# Patient Record
Sex: MALE | Race: WHITE | NOT HISPANIC OR LATINO | Employment: FULL TIME | URBAN - METROPOLITAN AREA
[De-identification: names, ages, dates, MRNs, and addresses within clinical notes are randomized per-mention and may not be internally consistent; named-entity substitution may affect disease eponyms.]

---

## 2020-01-23 ENCOUNTER — HOSPITAL ENCOUNTER (OUTPATIENT)
Dept: URGENT CARE | Facility: CLINIC | Age: 45
Discharge: HOME | End: 2020-01-23

## 2021-08-16 PROCEDURE — U0003 INFECTIOUS AGENT DETECTION BY NUCLEIC ACID (DNA OR RNA); SEVERE ACUTE RESPIRATORY SYNDROME CORONAVIRUS 2 (SARS-COV-2) (CORONAVIRUS DISEASE [COVID-19]), AMPLIFIED PROBE TECHNIQUE, MAKING USE OF HIGH THROUGHPUT TECHNOLOGIES AS DESCRIBED BY CMS-2020-01-R: HCPCS | Performed by: PHYSICIAN ASSISTANT

## 2021-08-23 PROCEDURE — U0003 INFECTIOUS AGENT DETECTION BY NUCLEIC ACID (DNA OR RNA); SEVERE ACUTE RESPIRATORY SYNDROME CORONAVIRUS 2 (SARS-COV-2) (CORONAVIRUS DISEASE [COVID-19]), AMPLIFIED PROBE TECHNIQUE, MAKING USE OF HIGH THROUGHPUT TECHNOLOGIES AS DESCRIBED BY CMS-2020-01-R: HCPCS | Performed by: NURSE PRACTITIONER

## 2021-08-26 ENCOUNTER — TELEPHONE (OUTPATIENT)
Dept: URGENT CARE | Facility: CLINIC | Age: 46
End: 2021-08-26

## 2021-08-26 NOTE — TELEPHONE ENCOUNTER
----- Message from Camron Cosby MD sent at 8/25/2021 11:28 AM EDT -----  Please call the patient regarding negative covid

## 2023-05-10 PROBLEM — J45.909 ASTHMA: Status: ACTIVE | Noted: 2023-05-10

## 2024-02-21 ENCOUNTER — OFFICE VISIT (OUTPATIENT)
Dept: FAMILY MEDICINE CLINIC | Facility: CLINIC | Age: 49
End: 2024-02-21
Payer: COMMERCIAL

## 2024-02-21 ENCOUNTER — HOSPITAL ENCOUNTER (OUTPATIENT)
Dept: GENERAL RADIOLOGY | Facility: HOSPITAL | Age: 49
Discharge: HOME | End: 2024-02-21
Payer: COMMERCIAL

## 2024-02-21 ENCOUNTER — LAB (OUTPATIENT)
Dept: LAB | Facility: HOSPITAL | Age: 49
End: 2024-02-21
Payer: COMMERCIAL

## 2024-02-21 VITALS
BODY MASS INDEX: 38.36 KG/M2 | TEMPERATURE: 97.6 F | OXYGEN SATURATION: 96 % | HEIGHT: 72 IN | SYSTOLIC BLOOD PRESSURE: 136 MMHG | WEIGHT: 283.2 LBS | DIASTOLIC BLOOD PRESSURE: 86 MMHG | HEART RATE: 94 BPM

## 2024-02-21 DIAGNOSIS — N52.9 ERECTILE DYSFUNCTION, UNSPECIFIED ERECTILE DYSFUNCTION TYPE: ICD-10-CM

## 2024-02-21 DIAGNOSIS — E29.1 HYPOGONADISM IN MALE: ICD-10-CM

## 2024-02-21 DIAGNOSIS — J30.9 ALLERGIC RHINITIS, UNSPECIFIED SEASONALITY, UNSPECIFIED TRIGGER: ICD-10-CM

## 2024-02-21 DIAGNOSIS — Z12.11 SCREENING FOR COLON CANCER: Primary | ICD-10-CM

## 2024-02-21 DIAGNOSIS — M25.512 CHRONIC LEFT SHOULDER PAIN: ICD-10-CM

## 2024-02-21 DIAGNOSIS — R73.01 IMPAIRED FASTING GLUCOSE: ICD-10-CM

## 2024-02-21 DIAGNOSIS — Z01.89 ROUTINE LAB DRAW: ICD-10-CM

## 2024-02-21 DIAGNOSIS — M54.50 CHRONIC BILATERAL LOW BACK PAIN WITHOUT SCIATICA: ICD-10-CM

## 2024-02-21 DIAGNOSIS — G89.29 CHRONIC BILATERAL LOW BACK PAIN WITHOUT SCIATICA: ICD-10-CM

## 2024-02-21 DIAGNOSIS — Z13.220 SCREENING FOR LIPID DISORDERS: ICD-10-CM

## 2024-02-21 DIAGNOSIS — R06.83 SNORING: ICD-10-CM

## 2024-02-21 DIAGNOSIS — G89.29 CHRONIC LEFT SHOULDER PAIN: ICD-10-CM

## 2024-02-21 DIAGNOSIS — M54.6 ACUTE BILATERAL THORACIC BACK PAIN: ICD-10-CM

## 2024-02-21 DIAGNOSIS — R53.83 FATIGUE, UNSPECIFIED TYPE: ICD-10-CM

## 2024-02-21 LAB
ALBUMIN SERPL-MCNC: 4.3 G/DL (ref 3.5–5.2)
ALBUMIN/GLOB SERPL: 1.5 G/DL
ALP SERPL-CCNC: 84 U/L (ref 39–117)
ALT SERPL W P-5'-P-CCNC: 75 U/L (ref 1–41)
ANION GAP SERPL CALCULATED.3IONS-SCNC: 9.7 MMOL/L (ref 5–15)
AST SERPL-CCNC: 33 U/L (ref 1–40)
BILIRUB SERPL-MCNC: 0.8 MG/DL (ref 0–1.2)
BUN SERPL-MCNC: 12 MG/DL (ref 6–20)
BUN/CREAT SERPL: 15.4 (ref 7–25)
CALCIUM SPEC-SCNC: 9.1 MG/DL (ref 8.6–10.5)
CHLORIDE SERPL-SCNC: 103 MMOL/L (ref 98–107)
CHOLEST SERPL-MCNC: 182 MG/DL (ref 0–200)
CO2 SERPL-SCNC: 28.3 MMOL/L (ref 22–29)
CREAT SERPL-MCNC: 0.78 MG/DL (ref 0.76–1.27)
DEPRECATED RDW RBC AUTO: 38.7 FL (ref 37–54)
EGFRCR SERPLBLD CKD-EPI 2021: 111.4 ML/MIN/1.73
ERYTHROCYTE [DISTWIDTH] IN BLOOD BY AUTOMATED COUNT: 13 % (ref 12.3–15.4)
GLOBULIN UR ELPH-MCNC: 2.8 GM/DL
GLUCOSE SERPL-MCNC: 87 MG/DL (ref 65–99)
HBA1C MFR BLD: 5.6 % (ref 4.8–5.6)
HCT VFR BLD AUTO: 45.9 % (ref 37.5–51)
HDLC SERPL-MCNC: 38 MG/DL (ref 40–60)
HGB BLD-MCNC: 15.3 G/DL (ref 13–17.7)
LDLC SERPL CALC-MCNC: 123 MG/DL (ref 0–100)
LDLC/HDLC SERPL: 3.18 {RATIO}
MCH RBC QN AUTO: 28.1 PG (ref 26.6–33)
MCHC RBC AUTO-ENTMCNC: 33.3 G/DL (ref 31.5–35.7)
MCV RBC AUTO: 84.2 FL (ref 79–97)
PLATELET # BLD AUTO: 270 10*3/MM3 (ref 140–450)
PMV BLD AUTO: 9.7 FL (ref 6–12)
POTASSIUM SERPL-SCNC: 4.3 MMOL/L (ref 3.5–5.2)
PROT SERPL-MCNC: 7.1 G/DL (ref 6–8.5)
RBC # BLD AUTO: 5.45 10*6/MM3 (ref 4.14–5.8)
SODIUM SERPL-SCNC: 141 MMOL/L (ref 136–145)
TESTOST SERPL-MCNC: 262 NG/DL (ref 249–836)
TRIGL SERPL-MCNC: 115 MG/DL (ref 0–150)
TSH SERPL DL<=0.05 MIU/L-ACNC: 1.39 UIU/ML (ref 0.27–4.2)
VLDLC SERPL-MCNC: 21 MG/DL (ref 5–40)
WBC NRBC COR # BLD AUTO: 6.7 10*3/MM3 (ref 3.4–10.8)

## 2024-02-21 PROCEDURE — 72100 X-RAY EXAM L-S SPINE 2/3 VWS: CPT

## 2024-02-21 PROCEDURE — 80061 LIPID PANEL: CPT

## 2024-02-21 PROCEDURE — 36415 COLL VENOUS BLD VENIPUNCTURE: CPT

## 2024-02-21 PROCEDURE — 83036 HEMOGLOBIN GLYCOSYLATED A1C: CPT

## 2024-02-21 PROCEDURE — 84403 ASSAY OF TOTAL TESTOSTERONE: CPT

## 2024-02-21 PROCEDURE — 72072 X-RAY EXAM THORAC SPINE 3VWS: CPT

## 2024-02-21 PROCEDURE — 80050 GENERAL HEALTH PANEL: CPT

## 2024-02-21 PROCEDURE — 73030 X-RAY EXAM OF SHOULDER: CPT

## 2024-02-21 RX ORDER — MONTELUKAST SODIUM 10 MG/1
10 TABLET ORAL NIGHTLY
Qty: 90 TABLET | Refills: 1 | Status: SHIPPED | OUTPATIENT
Start: 2024-02-21

## 2024-02-21 RX ORDER — SILDENAFIL 100 MG/1
100 TABLET, FILM COATED ORAL DAILY PRN
Qty: 30 TABLET | Refills: 0 | Status: SHIPPED | OUTPATIENT
Start: 2024-02-21

## 2024-02-21 NOTE — PROGRESS NOTES
"Chief Complaint  Establish Care    Subjective         Immanuel Garcia presents to [facility]  Patient presents to the office today to establish care.    Patient states that he is wanting to start working out with his son and wants to ensure that he is healthy.  Patient does state that he is due for lab.  He does state that he has a history of chronic left shoulder pain secondary to a previous football injury and has had surgery on this.  He states that he does have limited range of motion to the shoulder.  Patient also states that years ago he injured his back on a boat and states that he has had thoracic/lumbar back pain since.  He states that he never had this evaluated.  He states that if he jogs at all he has increased pain in this area.  Patient has noticed a fluctuation in his blood pressure.  I did ask him with check his daily and keep a log for our review.  He does state that he has concerns regarding his weight.  He states that he is trying to be more active and eat healthier.  He denies wanting any medications to help with weight loss.  He does state that he is having significant fatigue.  He states that he snores throughout the night.  He states that many years ago he had a sleep study but this was conclusive.  I did discuss sending him back up for another sleep study due to his fatigue and snoring.  Patient has never had a colonoscopy.  I did explain that the screenings for this started age 47 and we will get him set up with general surgery for his screening.         Objective     Vitals:    02/21/24 0929   BP: 136/86   BP Location: Right arm   Patient Position: Sitting   Cuff Size: Adult   Pulse: 94   Temp: 97.6 °F (36.4 °C)   SpO2: 96%   Weight: 128 kg (283 lb 3.2 oz)   Height: 182.9 cm (72.01\")      Body mass index is 38.4 kg/m².            Physical Exam  Vitals reviewed.   Constitutional:       Appearance: Normal appearance.   Cardiovascular:      Rate and Rhythm: Normal " rate and regular rhythm.      Pulses: Normal pulses.      Heart sounds: Normal heart sounds, S1 normal and S2 normal. No murmur heard.  Pulmonary:      Effort: Pulmonary effort is normal. No respiratory distress.      Breath sounds: Normal breath sounds.   Musculoskeletal:      Left shoulder: Tenderness present. Decreased range of motion.      Thoracic back: Tenderness present.      Lumbar back: Tenderness present.        Back:    Skin:     General: Skin is warm and dry.   Neurological:      Mental Status: He is alert and oriented to person, place, and time.   Psychiatric:         Attention and Perception: Attention normal.         Mood and Affect: Mood normal.         Behavior: Behavior normal.          Result Review :   The following data was reviewed by: JUDE Goodson on 02/21/2024:        ECG 12 Lead    Date/Time: 2/21/2024 11:53 AM  Performed by: Alvaro Paredes APRN    Authorized by: Alvaro Paredes APRN  Comparison: not compared with previous ECG   Rhythm: sinus rhythm  Rate: normal    Clinical impression: normal ECG  Comments: Sinus rhythm without ectopy           Assessment and Plan   Diagnoses and all orders for this visit:    1. Screening for colon cancer (Primary)  -     Ambulatory Referral to General Surgery    2. Routine lab draw  -     CBC (No Diff); Future  -     Comprehensive Metabolic Panel; Future  -     TSH; Future    3. Screening for lipid disorders  -     Lipid Panel; Future    4. Impaired fasting glucose  -     Hemoglobin A1c; Future    5. Fatigue, unspecified type  -     Testosterone; Future  -     Ambulatory Referral to Sleep Medicine    6. Hypogonadism in male  -     Testosterone; Future    7. Snoring  -     Ambulatory Referral to Sleep Medicine    8. Allergic rhinitis, unspecified seasonality, unspecified trigger  -     montelukast (Singulair) 10 MG tablet; Take 1 tablet by mouth Every Night.  Dispense: 90 tablet; Refill: 1    9. Chronic left shoulder pain  -     XR Shoulder 2+ View Left;  Future    10. Chronic bilateral low back pain without sciatica  -     XR Spine Thoracic 3 View; Future    11. Acute bilateral thoracic back pain  -     XR Spine Lumbar 2 or 3 View; Future    12. Erectile dysfunction, unspecified erectile dysfunction type  -     sildenafil (Viagra) 100 MG tablet; Take 1 tablet by mouth Daily As Needed for Erectile Dysfunction.  Dispense: 30 tablet; Refill: 0      65 minutes spent with the patient assessing, reviewing records, counseling and documenting.    Follow Up   Return in about 6 months (around 8/21/2024) for Recheck.  Patient was given instructions and counseling regarding his condition or for health maintenance advice. Please see specific information pulled into the AVS if appropriate.

## 2024-02-28 ENCOUNTER — OFFICE VISIT (OUTPATIENT)
Dept: SLEEP MEDICINE | Facility: HOSPITAL | Age: 49
End: 2024-02-28
Payer: COMMERCIAL

## 2024-02-28 VITALS
DIASTOLIC BLOOD PRESSURE: 86 MMHG | HEIGHT: 72 IN | OXYGEN SATURATION: 96 % | WEIGHT: 282.2 LBS | SYSTOLIC BLOOD PRESSURE: 129 MMHG | BODY MASS INDEX: 38.22 KG/M2 | HEART RATE: 87 BPM

## 2024-02-28 DIAGNOSIS — G47.33 OBSTRUCTIVE SLEEP APNEA, ADULT: Primary | ICD-10-CM

## 2024-02-28 DIAGNOSIS — E66.09 CLASS 2 OBESITY DUE TO EXCESS CALORIES WITH BODY MASS INDEX (BMI) OF 38.0 TO 38.9 IN ADULT, UNSPECIFIED WHETHER SERIOUS COMORBIDITY PRESENT: ICD-10-CM

## 2024-02-28 PROCEDURE — G0463 HOSPITAL OUTPT CLINIC VISIT: HCPCS

## 2024-02-28 NOTE — PROGRESS NOTES
Reason for Consultation: Sonorous snoring        Patient Care Team:  Alvaro Paredes APRN as PCP - General (Nurse Practitioner)  Gelacio Alonso MD, MPH as Consulting Physician (Sleep Medicine)      History of present illness:    Thank you for asking me to see your patient.  The patient is a 48 y.o. male has had a long history of snoring in fact he had a polysomnogram on 2/9/2010 here at [facility] and he did not meet criteria for sleep apnea.  His AHI was 0.2 and RDI 0.8.  His wife tells him he snores and it is probably getting worse.  He goes to bed between 1030 and 11 PM on the week and gets up between 640 5 and 7 AM and estimates he slept 4 to 5 hours but he feels tired.  On the weekends he goes to bed 20 11 PM and midnight gets up between 8 and 9.  Probably gained 30 pounds in the past 5 years and he knows he snores loudly has had choking arousals and sometimes morning headaches or dry throat.  He also has a sour taste in his mouth especially during the night and started to take Prilosec for this.  He is a restless sleeper and increasing his sleep time does not really help him feel rested.  1-2 alcoholic drinks per month and has 1 caffeinated beverage per day and does not use tobacco.    Eldridge:6    Data Reviewed: I reviewed his sleep study, sleep questionnaire and medical record      PMH:  Past Medical History:   Diagnosis Date    Acid reflux     Allergic     Seasonal    Headache 1993    Pressure headaches          Allergies:  Penicillins     Medication Review:   Current Outpatient Medications on File Prior to Visit   Medication Sig Dispense Refill    aspirin-acetaminophen-caffeine (EXCEDRIN MIGRAINE) 250-250-65 MG per tablet Take 1 tablet by mouth Every 6 (Six) Hours As Needed for Headache.      cetirizine (zyrTEC) 10 MG tablet Take 1 tablet by mouth Daily.      montelukast (Singulair) 10 MG tablet Take 1 tablet by mouth Every Night. 90 tablet 1    pantoprazole (PROTONIX) 40 MG EC tablet Take 1  "tablet by mouth Daily.      sildenafil (Viagra) 100 MG tablet Take 1 tablet by mouth Daily As Needed for Erectile Dysfunction. 30 tablet 0     No current facility-administered medications on file prior to visit.         Vital Signs:    Vitals:    02/28/24 1100   BP: 129/86   Pulse: 87   SpO2: 96%   Weight: 128 kg (282 lb 3.2 oz)   Height: 182.9 cm (72.01\")        Body mass index is 38.26 kg/m².  Neck Circumference: 19.5 inches      Physical Exam:    Constitutional:  Well developed 48 y.o. male that appears in no apparent distress.  Awake & oriented times 3.  Normal mood with normal recent and remote memory and normal judgement.  Eyes:  Conjunctivae normal.  Oropharynx: Moist mucous membranes without exudate and Mallampati 4  Neck: Trachea midline  Respiratory: Effort is not labored  Cardiovascular: Radial pulse regular  Musculoskeletal: Gait appears normal, no digital clubbing evident, no pre-tibial edema        Impression:   Encounter Diagnoses   Name Primary?    Obstructive sleep apnea, adult Yes    Class 2 obesity due to excess calories with body mass index (BMI) of 38.0 to 38.9 in adult, unspecified whether serious comorbidity present      Patient's BMI is Body mass index is 38.26 kg/m².    Will do a home sleep apnea test to see how much sleep disordered breathing this patient has.    Plan:    Sleep apnea test    The patient should practice good sleep hygiene measures.      Weight loss might be beneficial in this patient who has a Body mass index is 38.26 kg/m².      Pathophysiology of CARMINA described to the patient.  Cardiovascular complications of untreated CARMINA also reviewed.      The patient was cautioned about the dangers of drowsy driving.    Camron Alonso MD  Sleep Medicine  02/28/24  11:50 EST         "

## 2024-03-28 ENCOUNTER — HOSPITAL ENCOUNTER (OUTPATIENT)
Dept: SLEEP MEDICINE | Facility: HOSPITAL | Age: 49
Discharge: HOME | End: 2024-03-28
Admitting: PSYCHIATRY & NEUROLOGY
Payer: COMMERCIAL

## 2024-03-28 DIAGNOSIS — G47.33 OBSTRUCTIVE SLEEP APNEA, ADULT: ICD-10-CM

## 2024-03-28 PROCEDURE — 95806 SLEEP STUDY UNATT&RESP EFFT: CPT

## 2024-03-28 PROCEDURE — 95806 SLEEP STUDY UNATT&RESP EFFT: CPT | Performed by: PSYCHIATRY & NEUROLOGY

## 2024-04-05 DIAGNOSIS — G47.33 OBSTRUCTIVE SLEEP APNEA, ADULT: Primary | ICD-10-CM

## 2024-04-05 DIAGNOSIS — E66.09 CLASS 2 OBESITY DUE TO EXCESS CALORIES WITH BODY MASS INDEX (BMI) OF 38.0 TO 38.9 IN ADULT, UNSPECIFIED WHETHER SERIOUS COMORBIDITY PRESENT: ICD-10-CM

## 2024-04-08 ENCOUNTER — TELEPHONE (OUTPATIENT)
Dept: FAMILY MEDICINE CLINIC | Facility: CLINIC | Age: 49
End: 2024-04-08
Payer: COMMERCIAL

## 2024-04-08 NOTE — TELEPHONE ENCOUNTER
Received medical records from [facility] via fax. Record have been indexed into chart under media.

## 2024-04-09 ENCOUNTER — TELEPHONE (OUTPATIENT)
Dept: SLEEP MEDICINE | Facility: HOSPITAL | Age: 49
End: 2024-04-09
Payer: COMMERCIAL

## 2024-04-09 NOTE — TELEPHONE ENCOUNTER
Spoke with patient in regards of sleep study. Patient will use [employer] as DME. Submitted successfully. Follow up made.

## 2024-05-02 RX ORDER — PANTOPRAZOLE SODIUM 40 MG/1
40 TABLET, DELAYED RELEASE ORAL DAILY
Qty: 90 TABLET | Refills: 1 | Status: SHIPPED | OUTPATIENT
Start: 2024-05-02

## 2024-06-12 ENCOUNTER — OFFICE VISIT (OUTPATIENT)
Dept: SLEEP MEDICINE | Facility: HOSPITAL | Age: 49
End: 2024-06-12
Payer: COMMERCIAL

## 2024-06-12 VITALS
WEIGHT: 294.7 LBS | HEIGHT: 72 IN | HEART RATE: 87 BPM | SYSTOLIC BLOOD PRESSURE: 131 MMHG | BODY MASS INDEX: 39.91 KG/M2 | DIASTOLIC BLOOD PRESSURE: 81 MMHG | OXYGEN SATURATION: 94 %

## 2024-06-12 DIAGNOSIS — G47.33 OSA ON CPAP: Primary | ICD-10-CM

## 2024-06-12 DIAGNOSIS — E66.09 CLASS 2 OBESITY DUE TO EXCESS CALORIES WITH BODY MASS INDEX (BMI) OF 39.0 TO 39.9 IN ADULT, UNSPECIFIED WHETHER SERIOUS COMORBIDITY PRESENT: ICD-10-CM

## 2024-06-12 PROBLEM — E66.812 CLASS 2 OBESITY DUE TO EXCESS CALORIES WITH BODY MASS INDEX (BMI) OF 39.0 TO 39.9 IN ADULT: Status: ACTIVE | Noted: 2024-06-12

## 2024-06-12 PROCEDURE — G0463 HOSPITAL OUTPT CLINIC VISIT: HCPCS

## 2024-06-12 PROCEDURE — 99214 OFFICE O/P EST MOD 30 MIN: CPT | Performed by: PSYCHIATRY & NEUROLOGY

## 2024-06-12 NOTE — PROGRESS NOTES
"Follow Up Sleep Disorders Center Note     Chief Complaint: Sleep apnea on CPAP    Primary Care Physician: Alvaro Paredes APRN    Interval History:   The patient is a 49 y.o. male  who returns today unaccompanied.  He says he is sleeping better and better rested with CPAP.  His AHI on his diagnostic home sleep apnea test was just over 8.  It today's compliance download dates 1.5.    Downloaded PAP Data Evaluated For Therapeutic Response and Compliance:  DME is [employer].  Downloads between 5/8 and 6/6/2024.  Average usage is 5 hours and 28 minutes.  Average AHI is 1.5.  Average auto CPAP pressure is 10.7 he is used CPAP 87% of the last 30 days and for 6 hours and 19 minutes on average.  He says he has a little bit of trouble with mask leak because his face probably would do better with the larger mask.  He has not AirTouch mask.  He says he still wakes up 2-3 times per night to go to the bathroom.    Review of Systems:    A complete review of systems was done and all were negative with the exception of mask leak    Social History:    Social History     Socioeconomic History    Marital status:    Tobacco Use    Smoking status: Never     Passive exposure: Never    Smokeless tobacco: Never   Vaping Use    Vaping status: Never Used   Substance and Sexual Activity    Alcohol use: Yes     Alcohol/week: 1.0 standard drink of alcohol     Types: 1 Cans of beer per week     Comment: Social drink- maybe 2-3 times a month    Drug use: Never    Sexual activity: Yes     Partners: Female     Birth control/protection: Condom       Allergies:  Penicillins     Medication Review:  Reviewed.      Vital Signs:    Vitals:    06/12/24 0700   BP: 131/81   Pulse: 87   SpO2: 94%   Weight: 134 kg (294 lb 11.2 oz)   Height: 182.9 cm (72.01\")     Body mass index is 39.96 kg/m².    Physical Exam:    Constitutional:  Well developed 49 y.o. male that appears in no apparent distress.  Awake & oriented times 3.  Normal mood with normal " recent and remote memory and normal judgement.  Eyes:  Conjunctivae normal.      Self-administered [place] Sleepiness Scale test results: 8  0-5 Lower normal daytime sleepiness  6-10 Higher normal daytime sleepiness  11-12 Mild, 13-15 Moderate, & 16-24 Severe excessive daytime sleepiness       I have reviewed the above results and compared them with the patient's last downloads and reviewed with the patient.    Impression:     Encounter Diagnoses   Name Primary?    CARMINA on CPAP Yes    Class 2 obesity due to excess calories with body mass index (BMI) of 39.0 to 39.9 in adult, unspecified whether serious comorbidity present          Plan:  Good sleep hygiene measures should be maintained.  Weight loss would be beneficial in this patient who has obesity class II by Body mass index is 39.96 kg/m².      After evaluating the patient and assessing results available, the patient is benefiting from the treatment being provided.     The patient will continue PAP therapy.  Potential side effects of PAP therapy reviewed and addressed as needed.  After clinical evaluation and review of downloads, I recommend no changes to the patient's pressures.      I answered all of the patient's questions.  The patient will call the Sleep Disorder Center for any problems and will follow up 1 year.      Camron Alonso MD  Sleep Medicine  06/12/24  09:07 EDT

## 2024-07-31 DIAGNOSIS — N52.9 ERECTILE DYSFUNCTION, UNSPECIFIED ERECTILE DYSFUNCTION TYPE: ICD-10-CM

## 2024-07-31 RX ORDER — SILDENAFIL 100 MG/1
100 TABLET, FILM COATED ORAL DAILY PRN
Qty: 30 TABLET | Refills: 0 | Status: SHIPPED | OUTPATIENT
Start: 2024-07-31

## 2024-08-21 ENCOUNTER — OFFICE VISIT (OUTPATIENT)
Dept: FAMILY MEDICINE CLINIC | Facility: CLINIC | Age: 49
End: 2024-08-21
Payer: COMMERCIAL

## 2024-08-21 VITALS
BODY MASS INDEX: 39.6 KG/M2 | HEIGHT: 72 IN | HEART RATE: 97 BPM | OXYGEN SATURATION: 96 % | DIASTOLIC BLOOD PRESSURE: 78 MMHG | TEMPERATURE: 98 F | WEIGHT: 292.4 LBS | SYSTOLIC BLOOD PRESSURE: 130 MMHG

## 2024-08-21 DIAGNOSIS — M54.2 CERVICAL PAIN: ICD-10-CM

## 2024-08-21 DIAGNOSIS — M54.50 CHRONIC BILATERAL LOW BACK PAIN WITHOUT SCIATICA: ICD-10-CM

## 2024-08-21 DIAGNOSIS — R42 VERTIGO: ICD-10-CM

## 2024-08-21 DIAGNOSIS — R73.01 IMPAIRED FASTING GLUCOSE: ICD-10-CM

## 2024-08-21 DIAGNOSIS — Z01.89 ROUTINE LAB DRAW: ICD-10-CM

## 2024-08-21 DIAGNOSIS — N52.9 ERECTILE DYSFUNCTION, UNSPECIFIED ERECTILE DYSFUNCTION TYPE: ICD-10-CM

## 2024-08-21 DIAGNOSIS — E66.9 CLASS 2 OBESITY WITHOUT SERIOUS COMORBIDITY WITH BODY MASS INDEX (BMI) OF 39.0 TO 39.9 IN ADULT, UNSPECIFIED OBESITY TYPE: Primary | ICD-10-CM

## 2024-08-21 DIAGNOSIS — G89.29 CHRONIC BILATERAL LOW BACK PAIN WITHOUT SCIATICA: ICD-10-CM

## 2024-08-21 DIAGNOSIS — M54.6 ACUTE BILATERAL THORACIC BACK PAIN: ICD-10-CM

## 2024-08-21 DIAGNOSIS — Z13.220 SCREENING FOR LIPID DISORDERS: ICD-10-CM

## 2024-08-21 PROCEDURE — 99417 PROLNG OP E/M EACH 15 MIN: CPT | Performed by: NURSE PRACTITIONER

## 2024-08-21 PROCEDURE — 99215 OFFICE O/P EST HI 40 MIN: CPT | Performed by: NURSE PRACTITIONER

## 2024-08-21 RX ORDER — SILDENAFIL 100 MG/1
100 TABLET, FILM COATED ORAL DAILY PRN
Qty: 30 TABLET | Refills: 0 | Status: SHIPPED | OUTPATIENT
Start: 2024-08-21

## 2024-08-21 NOTE — PROGRESS NOTES
"Chief Complaint  6 month follow up  and Dizziness (Has been going on for 2 to 3 weeks )    Subjective         Immanuel Garcia presents to [facility]  Patient presents to the office for 6-month follow-up.  He states for the past 2 to 3 weeks he has been having frequent episodes where the room is spinning.  He states that last for approximately 30 to 45 seconds and then resolves.  He states that normally happens at night when he lays down.  Denies any nausea vomiting with it.  Patient also states that he has been having a lot of neck pain that radiates to the back of his head.  He states that it feels like a frequent pressure.  Patient also states that he has been having low back pain.  I did discuss referring patient to physical therapy regarding the low back, neck pain as well as the vertigo.  He verbalized understanding.    Patient does want to discuss weight loss options.  He states that he has been trying to be more active as well as eating healthier as well as smaller portions.  He states that he continues to struggle with his weight.  I did discuss GLP's putting the risk and benefits as well as possible side effects.  Patient does state that he is very sensitive medications therefore we would like to try to avoid the phentermine.  I explained to the patient that the insurance may require us to try this and if they do then I would suggest taking a half a tablet initially.  He verbalized understanding.  I explained to the patient that once    Dizziness         Objective     Vitals:    08/21/24 0923   BP: 130/78   BP Location: Left arm   Patient Position: Sitting   Cuff Size: Large Adult   Pulse: 97   Temp: 98 °F (36.7 °C)   SpO2: 96%   Weight: 133 kg (292 lb 6.4 oz)   Height: 182.9 cm (72.01\")      Body mass index is 39.65 kg/m².    Class 2 Severe Obesity (BMI >=35 and <=39.9). Obesity-related health conditions include the following: obstructive sleep apnea. Obesity is unchanged. BMI " is is above average; no BMI management plan is appropriate. We discussed portion control and increasing exercise.        Physical Exam  Vitals reviewed.   Constitutional:       Appearance: Normal appearance.   HENT:      Right Ear: Tympanic membrane, ear canal and external ear normal.      Left Ear: Tympanic membrane, ear canal and external ear normal.   Neck:     Cardiovascular:      Rate and Rhythm: Normal rate and regular rhythm.      Pulses: Normal pulses.      Heart sounds: Normal heart sounds, S1 normal and S2 normal. No murmur heard.  Pulmonary:      Effort: Pulmonary effort is normal. No respiratory distress.      Breath sounds: Normal breath sounds.   Musculoskeletal:      Cervical back: Muscular tenderness present.   Skin:     General: Skin is warm and dry.   Neurological:      Mental Status: He is alert and oriented to person, place, and time.   Psychiatric:         Attention and Perception: Attention normal.         Mood and Affect: Mood normal.         Behavior: Behavior normal.          Result Review :   The following data was reviewed by: JUDE Goodson on 08/21/2024:      Procedures    Assessment and Plan   Diagnoses and all orders for this visit:    1. Class 2 obesity without serious comorbidity with body mass index (BMI) of 39.0 to 39.9 in adult, unspecified obesity type (Primary)  -     Semaglutide-Weight Management 0.25 MG/0.5ML solution auto-injector; Inject 0.5 mL under the skin into the appropriate area as directed 1 (One) Time Per Week.  Dispense: 2 mL; Refill: 0    2. Erectile dysfunction, unspecified erectile dysfunction type  -     sildenafil (Viagra) 100 MG tablet; Take 1 tablet by mouth Daily As Needed for Erectile Dysfunction.  Dispense: 30 tablet; Refill: 0    3. Routine lab draw  -     CBC (No Diff); Future  -     Comprehensive Metabolic Panel; Future  -     Lipid Panel; Future  -     TSH+Free T4; Future  -     Hemoglobin A1c; Future    4. Chronic bilateral low back pain without  sciatica  -     Ambulatory Referral to Physical Therapy for Evaluation & Treatment    5. Acute bilateral thoracic back pain  -     Ambulatory Referral to Physical Therapy for Evaluation & Treatment    6. Impaired fasting glucose  -     Comprehensive Metabolic Panel; Future  -     Hemoglobin A1c; Future    7. Cervical pain  -     CBC (No Diff); Future  -     Comprehensive Metabolic Panel; Future  -     Lipid Panel; Future  -     TSH+Free T4; Future  -     Hemoglobin A1c; Future  -     Ambulatory Referral to Physical Therapy for Evaluation & Treatment    8. Vertigo  -     CBC (No Diff); Future  -     Comprehensive Metabolic Panel; Future  -     Lipid Panel; Future  -     TSH+Free T4; Future  -     Hemoglobin A1c; Future  -     Ambulatory Referral to Physical Therapy for Evaluation & Treatment    9. Screening for lipid disorders  -     Lipid Panel; Future      55 minutes spent with the patient assessing, counseling reviewing records and documenting.    Follow Up   Return in about 6 months (around 2/21/2025) for Recheck.  Patient was given instructions and counseling regarding his condition or for health maintenance advice. Please see specific information pulled into the AVS if appropriate.

## 2024-09-04 DIAGNOSIS — Z13.220 SCREENING FOR LIPID DISORDERS: ICD-10-CM

## 2024-09-04 DIAGNOSIS — R53.83 FATIGUE, UNSPECIFIED TYPE: ICD-10-CM

## 2024-09-04 DIAGNOSIS — R73.01 IMPAIRED FASTING GLUCOSE: ICD-10-CM

## 2024-09-04 DIAGNOSIS — Z01.89 ROUTINE LAB DRAW: ICD-10-CM

## 2024-09-04 DIAGNOSIS — E66.9 CLASS 2 OBESITY WITHOUT SERIOUS COMORBIDITY WITH BODY MASS INDEX (BMI) OF 39.0 TO 39.9 IN ADULT, UNSPECIFIED OBESITY TYPE: Primary | ICD-10-CM

## 2024-09-05 ENCOUNTER — LAB (OUTPATIENT)
Dept: LAB | Facility: HOSPITAL | Age: 49
End: 2024-09-05
Payer: COMMERCIAL

## 2024-09-05 DIAGNOSIS — M54.2 CERVICAL PAIN: ICD-10-CM

## 2024-09-05 DIAGNOSIS — R42 VERTIGO: ICD-10-CM

## 2024-09-05 DIAGNOSIS — R73.01 IMPAIRED FASTING GLUCOSE: ICD-10-CM

## 2024-09-05 DIAGNOSIS — Z01.89 ROUTINE LAB DRAW: ICD-10-CM

## 2024-09-05 DIAGNOSIS — Z13.220 SCREENING FOR LIPID DISORDERS: ICD-10-CM

## 2024-09-05 LAB
ALBUMIN SERPL-MCNC: 4.3 G/DL (ref 3.5–5.2)
ALBUMIN/GLOB SERPL: 1.7 G/DL
ALP SERPL-CCNC: 96 U/L (ref 39–117)
ALT SERPL W P-5'-P-CCNC: 57 U/L (ref 1–41)
ANION GAP SERPL CALCULATED.3IONS-SCNC: 5 MMOL/L (ref 5–15)
AST SERPL-CCNC: 25 U/L (ref 1–40)
BILIRUB SERPL-MCNC: 0.5 MG/DL (ref 0–1.2)
BUN SERPL-MCNC: 17 MG/DL (ref 6–20)
BUN/CREAT SERPL: 19.3 (ref 7–25)
CALCIUM SPEC-SCNC: 9.6 MG/DL (ref 8.6–10.5)
CHLORIDE SERPL-SCNC: 106 MMOL/L (ref 98–107)
CHOLEST SERPL-MCNC: 146 MG/DL (ref 0–200)
CO2 SERPL-SCNC: 28 MMOL/L (ref 22–29)
CREAT SERPL-MCNC: 0.88 MG/DL (ref 0.76–1.27)
DEPRECATED RDW RBC AUTO: 40.2 FL (ref 37–54)
EGFRCR SERPLBLD CKD-EPI 2021: 106.7 ML/MIN/1.73
ERYTHROCYTE [DISTWIDTH] IN BLOOD BY AUTOMATED COUNT: 13.1 % (ref 12.3–15.4)
GLOBULIN UR ELPH-MCNC: 2.6 GM/DL
GLUCOSE SERPL-MCNC: 110 MG/DL (ref 65–99)
HBA1C MFR BLD: 5.6 % (ref 4.8–5.6)
HCT VFR BLD AUTO: 43.1 % (ref 37.5–51)
HDLC SERPL-MCNC: 36 MG/DL (ref 40–60)
HGB BLD-MCNC: 14.8 G/DL (ref 13–17.7)
LDLC SERPL CALC-MCNC: 91 MG/DL (ref 0–100)
LDLC/HDLC SERPL: 2.48 {RATIO}
MCH RBC QN AUTO: 29.5 PG (ref 26.6–33)
MCHC RBC AUTO-ENTMCNC: 34.3 G/DL (ref 31.5–35.7)
MCV RBC AUTO: 85.9 FL (ref 79–97)
PLATELET # BLD AUTO: 257 10*3/MM3 (ref 140–450)
PMV BLD AUTO: 9.8 FL (ref 6–12)
POTASSIUM SERPL-SCNC: 4.3 MMOL/L (ref 3.5–5.2)
PROT SERPL-MCNC: 6.9 G/DL (ref 6–8.5)
RBC # BLD AUTO: 5.02 10*6/MM3 (ref 4.14–5.8)
SODIUM SERPL-SCNC: 139 MMOL/L (ref 136–145)
T4 FREE SERPL-MCNC: 1.04 NG/DL (ref 0.92–1.68)
TRIGL SERPL-MCNC: 103 MG/DL (ref 0–150)
TSH SERPL DL<=0.05 MIU/L-ACNC: 1.38 UIU/ML (ref 0.27–4.2)
VLDLC SERPL-MCNC: 19 MG/DL (ref 5–40)
WBC NRBC COR # BLD AUTO: 5.62 10*3/MM3 (ref 3.4–10.8)

## 2024-09-05 PROCEDURE — 36415 COLL VENOUS BLD VENIPUNCTURE: CPT

## 2024-09-05 PROCEDURE — 84439 ASSAY OF FREE THYROXINE: CPT

## 2024-09-05 PROCEDURE — 83036 HEMOGLOBIN GLYCOSYLATED A1C: CPT

## 2024-09-05 PROCEDURE — 80050 GENERAL HEALTH PANEL: CPT

## 2024-09-05 PROCEDURE — 80061 LIPID PANEL: CPT

## 2024-10-01 DIAGNOSIS — E66.812 CLASS 2 OBESITY WITHOUT SERIOUS COMORBIDITY WITH BODY MASS INDEX (BMI) OF 39.0 TO 39.9 IN ADULT, UNSPECIFIED OBESITY TYPE: ICD-10-CM

## 2024-10-01 NOTE — TELEPHONE ENCOUNTER
Caller: JoseImmanuel    Relationship: Self    Best call back number: [phone]     Requested Prescriptions:   Requested Prescriptions     Pending Prescriptions Disp Refills    Semaglutide-Weight Management 0.25 MG/0.5ML solution auto-injector 2 mL 0     Sig: Inject 0.5 mL under the skin into the appropriate area as directed 1 (One) Time Per Week.        Pharmacy where request should be sent: [id number] - [place], [place] - [address] [facility] [phone] [facility] [phone]      Last office visit with prescribing clinician: 8/21/2024   Last telemedicine visit with prescribing clinician: Visit date not found   Next office visit with prescribing clinician: Visit date not found     Additional details provided by patient: PATIENT DOESN'T KNOW IF THE DOSE NEEDS TO BE CHANGED.     Does the patient have less than a 3 day supply:  [x] Yes  [] No    Would you like a call back once the refill request has been completed: [x] Yes [] No    If the office needs to give you a call back, can they leave a voicemail: [x] Yes [] No    Beatris Kirk Rep   10/01/24 11:00 EDT

## 2024-10-08 ENCOUNTER — OFFICE VISIT (OUTPATIENT)
Dept: SURGERY | Facility: CLINIC | Age: 49
End: 2024-10-08
Payer: COMMERCIAL

## 2024-10-08 ENCOUNTER — PREP FOR SURGERY (OUTPATIENT)
Dept: OTHER | Facility: HOSPITAL | Age: 49
End: 2024-10-08
Payer: COMMERCIAL

## 2024-10-08 VITALS
DIASTOLIC BLOOD PRESSURE: 91 MMHG | WEIGHT: 278 LBS | HEIGHT: 72 IN | BODY MASS INDEX: 37.65 KG/M2 | HEART RATE: 87 BPM | SYSTOLIC BLOOD PRESSURE: 146 MMHG

## 2024-10-08 DIAGNOSIS — Z12.11 SCREENING FOR MALIGNANT NEOPLASM OF COLON: Primary | ICD-10-CM

## 2024-10-08 RX ORDER — SODIUM CHLORIDE 0.9 % (FLUSH) 0.9 %
10 SYRINGE (ML) INJECTION AS NEEDED
OUTPATIENT
Start: 2024-10-08

## 2024-10-08 RX ORDER — SODIUM CHLORIDE 0.9 % (FLUSH) 0.9 %
3 SYRINGE (ML) INJECTION EVERY 12 HOURS SCHEDULED
OUTPATIENT
Start: 2024-10-08

## 2024-10-08 RX ORDER — SODIUM CHLORIDE 9 MG/ML
40 INJECTION, SOLUTION INTRAVENOUS AS NEEDED
OUTPATIENT
Start: 2024-11-21 | End: 2024-11-21

## 2024-10-08 RX ORDER — SODIUM, POTASSIUM,MAG SULFATES 17.5-3.13G
SOLUTION, RECONSTITUTED, ORAL ORAL
Qty: 354 ML | Refills: 0 | Status: SHIPPED | OUTPATIENT
Start: 2024-10-08

## 2024-10-08 NOTE — PROGRESS NOTES
Chief Complaint: Colonoscopy    Subjective      Colonoscopy consultation       History of Present Illness  Immanuel Garcia is a 49 y.o. male presents to [facility] for colonoscopy consultation.    Patient presents today on referral from Alvaro Gonzalez for colonoscopy consultation.  Patient denies any abdominal pain, change in bowel habit, or rectal bleeding.  Denies any family history of colorectal cancer.  No previous colonoscopy.    Admits to CARMINA.  Uses a CPAP.    Denies any cardiac issues.    Patient does take Wegovy  Objective     Past Medical History:   Diagnosis Date    Acid reflux     Allergic     Seasonal    Headache 1993    Pressure headaches       Past Surgical History:   Procedure Laterality Date    HERNIA REPAIR      SHOULDER SURGERY Left     TONSILLECTOMY      WISDOM TOOTH EXTRACTION         Outpatient Medications Marked as Taking for the 10/8/24 encounter (Office Visit) with Sajan April, APRN   Medication Sig Dispense Refill    aspirin-acetaminophen-caffeine (EXCEDRIN MIGRAINE) 250-250-65 MG per tablet Take 1 tablet by mouth Every 6 (Six) Hours As Needed for Headache.      cetirizine (zyrTEC) 10 MG tablet Take 1 tablet by mouth Daily.      pantoprazole (PROTONIX) 40 MG EC tablet Take 1 tablet by mouth Daily. 90 tablet 1    Semaglutide-Weight Management 0.5 MG/0.5ML solution auto-injector Inject 0.5 mL under the skin into the appropriate area as directed 1 (One) Time Per Week. 2 mL 0    sildenafil (Viagra) 100 MG tablet Take 1 tablet by mouth Daily As Needed for Erectile Dysfunction. 30 tablet 0       Allergies   Allergen Reactions    Penicillins Itching        Family History   Problem Relation Age of Onset    Diabetes Father     Diverticulitis Father     Rheum arthritis Father     Arthritis Father     Hyperlipidemia Father     Sleep apnea Brother     Heart disease Paternal Uncle        Social History     Socioeconomic History    Marital status:    Tobacco Use     "Smoking status: Never     Passive exposure: Never    Smokeless tobacco: Never   Vaping Use    Vaping status: Never Used   Substance and Sexual Activity    Alcohol use: Yes     Alcohol/week: 1.0 standard drink of alcohol     Types: 1 Cans of beer per week     Comment: Social drink- maybe 2-3 times a month    Drug use: Never    Sexual activity: Yes     Partners: Female     Birth control/protection: Condom       Review of Systems   Constitutional:  Negative for chills and fever.   Gastrointestinal:  Negative for abdominal distention, abdominal pain, anal bleeding, blood in stool, constipation, diarrhea and rectal pain.        Vital Signs:   /91 (BP Location: Right arm, Patient Position: Sitting, Cuff Size: Adult)   Pulse 87   Ht 182.9 cm (72.01\")   Wt 126 kg (278 lb)   BMI 37.69 kg/m²      Physical Exam  Vitals and nursing note reviewed.   Constitutional:       General: He is not in acute distress.     Appearance: Normal appearance. He is not ill-appearing.   HENT:      Head: Normocephalic and atraumatic.   Cardiovascular:      Rate and Rhythm: Normal rate.   Pulmonary:      Effort: Pulmonary effort is normal.      Breath sounds: No stridor.   Abdominal:      Palpations: Abdomen is soft.      Tenderness: There is no guarding.   Musculoskeletal:         General: No deformity. Normal range of motion.   Skin:     General: Skin is warm and dry.      Coloration: Skin is not jaundiced.   Neurological:      General: No focal deficit present.      Mental Status: He is alert and oriented to person, place, and time.   Psychiatric:         Mood and Affect: Mood normal.         Thought Content: Thought content normal.          Result Review :          []  Laboratory  []  Radiology  []  Pathology  []  Microbiology  []  EKG/Telemetry   []  Cardiology/Vascular   []  Old records  I spent 15 minutes caring for Immanuel on this date of service. This time includes time spent by me in the following activities: reviewing tests, " obtaining and/or reviewing a separately obtained history, performing a medically appropriate examination and/or evaluation, ordering medications, tests, or procedures, and documenting information in the medical record.     Assessment and Plan    Diagnoses and all orders for this visit:    1. Screening for malignant neoplasm of colon (Primary)    Other orders  -     sodium-potassium-magnesium sulfates (Suprep Bowel Prep Kit) 17.5-3.13-1.6 GM/177ML solution oral solution; Take as directed.  Instructions given in office.  Dispense: 2 bottles  Dispense: 354 mL; Refill: 0        Follow Up   Return for Schedule colonoscopy with Dr. Roman on 11/21/2024 [facility].    Hospital arrival time: 10:00.    Possible risks/complications, benefits, and alternatives to surgical or invasive procedures have been explained to patient and/or legal guardian.    Patient has been evaluated and can tolerate anesthesia and/or sedation. Risks, benefits, and alternatives to anesthesia and sedation have been explained to the patient and/or legal guardian. Patient verbalizes understanding and is willing to proceed with the above plan.     Patient was given instructions and counseling regarding his condition or for health maintenance advice. Please see specific information pulled into the AVS if appropriate.      Thank you for allowing me to participate in the care of this patient. Please call with questions or concerns.

## 2024-11-04 RX ORDER — SEMAGLUTIDE 1 MG/.5ML
1 INJECTION, SOLUTION SUBCUTANEOUS WEEKLY
Qty: 2 ML | Refills: 0 | Status: SHIPPED | OUTPATIENT
Start: 2024-11-04

## 2024-11-04 NOTE — TELEPHONE ENCOUNTER
UPCOMING APPTS  No upcoming appointments  LAST OFFICE VISIT - THIS DEPT  8/21/2024 Alvaro Paredes, APRN

## 2024-11-13 NOTE — PRE-PROCEDURE INSTRUCTIONS
"Instructed on date and arrival time of 1000. Instructed that arrival time is not their procedure time but allows time to prepare for procedure.  Come to entrance \"C\". Must have  over age [address] to drive home.  May have two visitors; however, children under 12 must stay in waiting room.  Discussed clear liquid diet (no red or purple), bowel prep, and NPO.  May take medications as usual except for blood thinners, diabetic medications, and weight loss medications.  Verbalized understanding of instructions given.  Instructed to call for questions or concerns.  Hold Wegovy for one week prior to procedure.  "

## 2024-11-19 ENCOUNTER — ANESTHESIA EVENT (OUTPATIENT)
Dept: GASTROENTEROLOGY | Facility: HOSPITAL | Age: 49
End: 2024-11-19
Payer: COMMERCIAL

## 2024-11-19 NOTE — ANESTHESIA PREPROCEDURE EVALUATION
Anesthesia Evaluation     Patient summary reviewed and Nursing notes reviewed   history of anesthetic complications:  PONV prolonged sedation  NPO Solid Status: > 8 hours  NPO Liquid Status: > 4 hours           Airway   Mallampati: II  TM distance: >3 FB  Neck ROM: full  No difficulty expected and Large neck circumference  Dental - normal exam     Pulmonary - normal exam    breath sounds clear to auscultation  (+) asthma (childhood only),sleep apnea on CPAP  Cardiovascular - normal exam  Exercise tolerance: good (4-7 METS)    ECG reviewed  Rhythm: regular  Rate: normal        Neuro/Psych  (+) headaches  GI/Hepatic/Renal/Endo    (+) obesity, morbid obesity, GERD well controlled    Musculoskeletal     Abdominal   (+) obese    Abdomen: soft.   Substance History      OB/GYN          Other        ROS/Med Hx Other: EKG 2/21/2024: HR 84 SR  Borderline prolonged WV interval  ST elevation, probable normal early repolarization pattern    Last dose Wegovy 11/13                       Anesthesia Plan    ASA 3     general   total IV anesthesia  (Total IV Anesthesia    Patient understands anesthesia not responsible for dental damage.  )  intravenous induction     Anesthetic plan, risks, benefits, and alternatives have been provided, discussed and informed consent has been obtained with: patient and spouse/significant other.  Pre-procedure education provided  Plan discussed with CRNA.        CODE STATUS:

## 2024-11-21 ENCOUNTER — HOSPITAL ENCOUNTER (OUTPATIENT)
Facility: HOSPITAL | Age: 49
Setting detail: HOSPITAL OUTPATIENT SURGERY
Discharge: HOME | End: 2024-11-21
Attending: SURGERY | Admitting: SURGERY
Payer: COMMERCIAL

## 2024-11-21 ENCOUNTER — ANESTHESIA (OUTPATIENT)
Dept: GASTROENTEROLOGY | Facility: HOSPITAL | Age: 49
End: 2024-11-21
Payer: COMMERCIAL

## 2024-11-21 VITALS
SYSTOLIC BLOOD PRESSURE: 139 MMHG | HEART RATE: 90 BPM | RESPIRATION RATE: 12 BRPM | TEMPERATURE: 97.6 F | BODY MASS INDEX: 35.99 KG/M2 | WEIGHT: 265.43 LBS | OXYGEN SATURATION: 95 % | DIASTOLIC BLOOD PRESSURE: 92 MMHG

## 2024-11-21 DIAGNOSIS — Z12.11 SCREENING FOR MALIGNANT NEOPLASM OF COLON: ICD-10-CM

## 2024-11-21 PROCEDURE — 25010000002 LIDOCAINE PF 2% 2 % SOLUTION: Performed by: NURSE ANESTHETIST, CERTIFIED REGISTERED

## 2024-11-21 PROCEDURE — 25010000002 PROPOFOL 10 MG/ML EMULSION: Performed by: NURSE ANESTHETIST, CERTIFIED REGISTERED

## 2024-11-21 RX ORDER — SODIUM CHLORIDE, SODIUM LACTATE, POTASSIUM CHLORIDE, CALCIUM CHLORIDE 600; 310; 30; 20 MG/100ML; MG/100ML; MG/100ML; MG/100ML
30 INJECTION, SOLUTION INTRAVENOUS CONTINUOUS
Status: DISCONTINUED | OUTPATIENT
Start: 2024-11-21 | End: 2024-11-21 | Stop reason: HOSPADM

## 2024-11-21 RX ORDER — PROPOFOL 10 MG/ML
VIAL (ML) INTRAVENOUS AS NEEDED
Status: DISCONTINUED | OUTPATIENT
Start: 2024-11-21 | End: 2024-11-21 | Stop reason: SURG

## 2024-11-21 RX ORDER — LIDOCAINE HYDROCHLORIDE 20 MG/ML
INJECTION, SOLUTION EPIDURAL; INFILTRATION; INTRACAUDAL; PERINEURAL AS NEEDED
Status: DISCONTINUED | OUTPATIENT
Start: 2024-11-21 | End: 2024-11-21 | Stop reason: SURG

## 2024-11-21 RX ORDER — SODIUM CHLORIDE 0.9 % (FLUSH) 0.9 %
3 SYRINGE (ML) INJECTION EVERY 12 HOURS SCHEDULED
Status: DISCONTINUED | OUTPATIENT
Start: 2024-11-21 | End: 2024-11-21 | Stop reason: HOSPADM

## 2024-11-21 RX ORDER — SODIUM CHLORIDE 0.9 % (FLUSH) 0.9 %
10 SYRINGE (ML) INJECTION AS NEEDED
Status: DISCONTINUED | OUTPATIENT
Start: 2024-11-21 | End: 2024-11-21 | Stop reason: HOSPADM

## 2024-11-21 RX ORDER — SODIUM CHLORIDE 9 MG/ML
40 INJECTION, SOLUTION INTRAVENOUS AS NEEDED
Status: DISCONTINUED | OUTPATIENT
Start: 2024-11-21 | End: 2024-11-21 | Stop reason: HOSPADM

## 2024-11-21 RX ADMIN — PROPOFOL 100 MG: 10 INJECTION, EMULSION INTRAVENOUS at 11:53

## 2024-11-21 RX ADMIN — PROPOFOL 225 MCG/KG/MIN: 10 INJECTION, EMULSION INTRAVENOUS at 11:53

## 2024-11-21 RX ADMIN — LIDOCAINE HYDROCHLORIDE 100 MG: 20 INJECTION, SOLUTION EPIDURAL; INFILTRATION; INTRACAUDAL; PERINEURAL at 11:53

## 2024-11-21 NOTE — ANESTHESIA POSTPROCEDURE EVALUATION
Patient: Immanuel Garcia    Procedure Summary       Date: 11/21/24 Room / Location: [facility] ENDOSCOPY 3 / [facility] ENDOSCOPY    Anesthesia Start: 1151 Anesthesia Stop: 1211    Procedure: COLONOSCOPY with possible biopsies. Diagnosis:       Screening for malignant neoplasm of colon      (Screening for malignant neoplasm of colon [Z12.11])    Surgeons: Luis Roman MD Provider: Donal Adams CRNA    Anesthesia Type: general ASA Status: 3            Anesthesia Type: general    Vitals  Vitals Value Taken Time   /92 11/21/24 1225   Temp 36.4 °C (97.6 °F) 11/21/24 1209   Pulse 90 11/21/24 1225   Resp 12 11/21/24 1209   SpO2 95 % 11/21/24 1225           Post Anesthesia Care and Evaluation    Post-procedure mental status: acceptable.  Pain management: satisfactory to patient    Airway patency: patent  Anesthetic complications: No anesthetic complications    Cardiovascular status: acceptable  Respiratory status: acceptable    Comments: Per chart review

## 2024-11-21 NOTE — H&P
General Surgery/Colorectal Surgery Note    Patient Name:  Immanuel Garcia  YOB: 1975  [id number]    Referring Provider: Alvaro Paredes APRN      Patient Care Team:  Alvaro Paredes APRN as PCP - General (Nurse Practitioner)    Subjective .     History of present illness:    HPI   referral from Alvaro Gonzalez for colonoscopy consultation. Patient denies any abdominal pain, change in bowel habit, or rectal bleeding. Denies any family history of colorectal cancer. No previous colonoscopy.     History:  Past Medical History:   Diagnosis Date    Acid reflux     Allergic     Seasonal    Headache 1993    Pressure headaches    PONV (postoperative nausea and vomiting)     Sleep apnea        Past Surgical History:   Procedure Laterality Date    HERNIA REPAIR      SHOULDER SURGERY Left     TONSILLECTOMY      WISDOM TOOTH EXTRACTION         Family History   Problem Relation Age of Onset    Diabetes Father     Diverticulitis Father     Rheum arthritis Father     Arthritis Father     Hyperlipidemia Father     Sleep apnea Brother     Heart disease Paternal Uncle        Social History     Tobacco Use    Smoking status: Never     Passive exposure: Never    Smokeless tobacco: Never   Vaping Use    Vaping status: Never Used   Substance Use Topics    Alcohol use: Yes     Alcohol/week: 1.0 standard drink of alcohol     Types: 1 Cans of beer per week     Comment: Social drink- maybe 2-3 times a month    Drug use: Never       Review of Systems: See HPI    Review of Systems            Medications Prior to Admission   Medication Sig Dispense Refill Last Dose/Taking    aspirin-acetaminophen-caffeine (EXCEDRIN MIGRAINE) 250-250-65 MG per tablet Take 1 tablet by mouth Every 6 (Six) Hours As Needed for Headache.   Past Week    cetirizine (zyrTEC) 10 MG tablet Take 1 tablet by mouth Daily.   11/20/2024    pantoprazole (PROTONIX) 40 MG EC tablet Take 1 tablet by mouth Daily. 90 tablet 1 11/20/2024    montelukast (Singulair) 10 MG tablet  Take 1 tablet by mouth Every Night. (Patient not taking: Reported on 10/8/2024) 90 tablet 1 More than a month    Semaglutide-Weight Management (Wegovy) 1 MG/0.5ML solution auto-injector Inject 0.5 mL under the skin into the appropriate area as directed 1 (One) Time Per Week. 2 mL 0 11/13/2024    sildenafil (Viagra) 100 MG tablet Take 1 tablet by mouth Daily As Needed for Erectile Dysfunction. 30 tablet 0          Home Meds:      Prior to Admission medications    Medication Sig Start Date End Date Taking? Authorizing Provider   aspirin-acetaminophen-caffeine (EXCEDRIN MIGRAINE) 250-250-65 MG per tablet Take 1 tablet by mouth Every 6 (Six) Hours As Needed for Headache.   Yes Provider, MD Hansel   cetirizine (zyrTEC) 10 MG tablet Take 1 tablet by mouth Daily.   Yes Emergency, Nurse Epic, RN   pantoprazole (PROTONIX) 40 MG EC tablet Take 1 tablet by mouth Daily. 5/2/24  Yes Alvaro Paredes APRN   montelukast (Singulair) 10 MG tablet Take 1 tablet by mouth Every Night.  Patient not taking: Reported on 10/8/2024 2/21/24   Alvaro Paredes APRN   Semaglutide-Weight Management (Wegovy) 1 MG/0.5ML solution auto-injector Inject 0.5 mL under the skin into the appropriate area as directed 1 (One) Time Per Week. 11/4/24   Alvaro Paredes APRN   sildenafil (Viagra) 100 MG tablet Take 1 tablet by mouth Daily As Needed for Erectile Dysfunction. 8/21/24   Alvaro Paredes APRN   sodium-potassium-magnesium sulfates (Suprep Bowel Prep Kit) 17.5-3.13-1.6 GM/177ML solution oral solution Take as directed.  Instructions given in office.  Dispense: 2 bottles 10/8/24 11/21/24  Sajan, April, APRN            Allergies:  Penicillins      Objective     Vital Signs   Temp:  [97.1 °F (36.2 °C)] 97.1 °F (36.2 °C)  Heart Rate:  [93] 93  Resp:  [16] 16  BP: (122)/(84) 122/84    Physical Exam:     Physical Exam    NAD, A/O x 4, normal circulation, normal respiration      Result Review :Labs  Result Review  Imaging  Med Tab  Media    Assessment & Plan      Diagnoses and all orders for this visit:    1. Screening for malignant neoplasm of colon  -     sodium chloride 0.9 % flush 3 mL  -     sodium chloride 0.9 % flush 10 mL  -     sodium chloride 0.9 % infusion 40 mL    Other orders  -     Continuous Pulse Oximetry; Standing  -     POC Glucose Once; Standing  -     Insert Peripheral IV; Standing  -     lactated ringers infusion  -     Continuous Pulse Oximetry  -     POC Glucose Once  -     Insert Peripheral IV  -     Follow Anesthesia Guidelines / Protocol; Standing  -     Obtain Informed Consent; Standing  -     Verify Bowel Prep Was Successful; Standing  -     Give Tap Water Enema If Bowel Prep Insufficient; Standing  -     Insert Peripheral IV; Standing  -     Saline Lock & Maintain IV Access; Standing  -     Place Sequential Compression Device; Standing  -     Maintain Sequential Compression Device; Standing  -     Verify NPO Status; Standing  -     Follow Anesthesia Guidelines / Protocol  -     Obtain Informed Consent  -     Verify Bowel Prep Was Successful  -     Give Tap Water Enema If Bowel Prep Insufficient  -     Insert Peripheral IV  -     Saline Lock & Maintain IV Access  -     Place Sequential Compression Device  -     Maintain Sequential Compression Device  -     Verify NPO Status           Risks including bleeding, perforation, pain, infection, missed polyp(s). Benefits, and alternatives of Colonoscopy discussed with patient.  All questions answered.  Consent verified.         Luis Roman MD  11/21/24 10:58 EST

## 2024-12-05 ENCOUNTER — TELEPHONE (OUTPATIENT)
Dept: FAMILY MEDICINE CLINIC | Facility: CLINIC | Age: 49
End: 2024-12-05
Payer: COMMERCIAL

## 2024-12-05 NOTE — TELEPHONE ENCOUNTER
Caller: Immanuel Garcia    Relationship: Self    Best call back number: [phone]    Which medication are you concerned about: Semaglutide-Weight Management (Wegovy) 1 MG/0.5ML solution auto-injector     What are your concerns: PATIENT IS WANTING TO KNOW IF HE NEEDS AN INCREASE OF DOSAGE FOR WEGOVY SCRIPT. THE CURRENT SCRIPT HAS BEEN WORKING WELL FOR HIM SO FAR. HE WILL BE DUE TO TAKE NEXT SHOT SUNDAY, 12/08/2024. HE WOULD LIKE CALL BACK TO LET HIM KNOW.    [facility] IS PREFERRED PHARMACY.

## 2024-12-06 RX ORDER — SEMAGLUTIDE 1.7 MG/.75ML
1.7 INJECTION, SOLUTION SUBCUTANEOUS WEEKLY
Qty: 3 ML | Refills: 0 | Status: SHIPPED | OUTPATIENT
Start: 2024-12-06

## 2024-12-09 ENCOUNTER — TELEPHONE (OUTPATIENT)
Dept: FAMILY MEDICINE CLINIC | Facility: CLINIC | Age: 49
End: 2024-12-09
Payer: COMMERCIAL

## 2024-12-09 RX ORDER — PANTOPRAZOLE SODIUM 40 MG/1
40 TABLET, DELAYED RELEASE ORAL DAILY
Qty: 90 TABLET | Refills: 1 | Status: SHIPPED | OUTPATIENT
Start: 2024-12-09

## 2024-12-09 NOTE — TELEPHONE ENCOUNTER
Caller: Immanuel Garcia    Relationship to patient: Self      Best call back number: [phone]     Provider: RAJESH DENTON    Medication PA needed: PANTOPRAZOLE     Reason for call/Prior Auth: NEED IT SENT TO PHARMACY

## 2025-01-08 RX ORDER — SEMAGLUTIDE 1.7 MG/.75ML
1.7 INJECTION, SOLUTION SUBCUTANEOUS WEEKLY
Qty: 3 ML | Refills: 0 | Status: SHIPPED | OUTPATIENT
Start: 2025-01-08

## 2025-01-09 ENCOUNTER — TELEPHONE (OUTPATIENT)
Dept: FAMILY MEDICINE CLINIC | Facility: CLINIC | Age: 50
End: 2025-01-09
Payer: COMMERCIAL

## 2025-01-09 NOTE — TELEPHONE ENCOUNTER
Caller: Immanuel Garcia    Relationship: Self    Best call back number: [phone]    What is the best time to reach you: ANYTIME    Who are you requesting to speak with (clinical staff, provider,  specific staff member): NURSE OR JUDE LOPEZ     What was the call regarding: PATIENT IS CALLING REGARDING THE WEGOVY.  INSURANCE WILL NOT COVER THIS MEDICATION AND HIS OUT OF POCKET IS GOING TO BE 1300.00 PATIENT WOULD LIKE TO SPEAK TO PCP REGARDING MAYBE TO DO SOMETHING ELSE OR WHAT HE WOULD SUGGEST TO DO.

## 2025-01-15 ENCOUNTER — OFFICE VISIT (OUTPATIENT)
Dept: FAMILY MEDICINE CLINIC | Facility: CLINIC | Age: 50
End: 2025-01-15
Payer: COMMERCIAL

## 2025-01-15 VITALS
DIASTOLIC BLOOD PRESSURE: 72 MMHG | OXYGEN SATURATION: 96 % | SYSTOLIC BLOOD PRESSURE: 118 MMHG | TEMPERATURE: 96.5 F | HEIGHT: 72 IN | BODY MASS INDEX: 35.03 KG/M2 | HEART RATE: 99 BPM | WEIGHT: 258.6 LBS

## 2025-01-15 DIAGNOSIS — E66.812 CLASS 2 OBESITY WITHOUT SERIOUS COMORBIDITY WITH BODY MASS INDEX (BMI) OF 35.0 TO 35.9 IN ADULT, UNSPECIFIED OBESITY TYPE: Primary | ICD-10-CM

## 2025-01-15 LAB
AMPHET+METHAMPHET UR QL: NEGATIVE
AMPHETAMINE INTERNAL CONTROL: NORMAL
AMPHETAMINES UR QL: NEGATIVE
BARBITURATE INTERNAL CONTROL: NORMAL
BARBITURATES UR QL SCN: NEGATIVE
BENZODIAZ UR QL SCN: NEGATIVE
BENZODIAZEPINE INTERNAL CONTROL: NORMAL
BUPRENORPHINE INTERNAL CONTROL: NORMAL
BUPRENORPHINE SERPL-MCNC: NEGATIVE NG/ML
CANNABINOIDS SERPL QL: NEGATIVE
COCAINE INTERNAL CONTROL: NORMAL
COCAINE UR QL: NEGATIVE
EXPIRATION DATE: NORMAL
Lab: NORMAL
MDMA (ECSTASY) INTERNAL CONTROL: NORMAL
MDMA UR QL SCN: NEGATIVE
METHADONE INTERNAL CONTROL: NORMAL
METHADONE UR QL SCN: NEGATIVE
METHAMPHETAMINE INTERNAL CONTROL: NORMAL
MORPHINE INTERNAL CONTROL: NORMAL
MORPHINE/OPIATES SCREEN, URINE: NEGATIVE
OXYCODONE INTERNAL CONTROL: NORMAL
OXYCODONE UR QL SCN: NEGATIVE
PCP UR QL SCN: NEGATIVE
PHENCYCLIDINE INTERNAL CONTROL: NORMAL
THC INTERNAL CONTROL: NORMAL

## 2025-01-15 PROCEDURE — 99213 OFFICE O/P EST LOW 20 MIN: CPT | Performed by: NURSE PRACTITIONER

## 2025-01-15 RX ORDER — PHENTERMINE HYDROCHLORIDE 37.5 MG/1
37.5 TABLET ORAL
Qty: 30 TABLET | Refills: 0 | Status: SHIPPED | OUTPATIENT
Start: 2025-01-15

## 2025-01-15 NOTE — PROGRESS NOTES
"Chief Complaint  Obesity    Subjective         Immanuel Garcia presents to [facility]  Patient presents to the office for weight management and nutritional counseling.  Patient states that he is going to have to discontinue the Wegovy due to his deductible and the cost of the medication.  Patient is down approximately 30 pounds and states that he is eating healthier and trying to be more active.  Patient states that his appetite has increased and he is fighting cravings on a daily basis.    Obesity       Objective     Vitals:    01/15/25 0750   BP: 118/72   BP Location: Right arm   Patient Position: Sitting   Cuff Size: Adult   Pulse: 99   Temp: 96.5 °F (35.8 °C)   TempSrc: Temporal   SpO2: 96%   Weight: 117 kg (258 lb 9.6 oz)   Height: 182.9 cm (72\")      Body mass index is 35.07 kg/m².             Physical Exam  Vitals reviewed.   Constitutional:       Appearance: Normal appearance.   Cardiovascular:      Rate and Rhythm: Normal rate and regular rhythm.      Pulses: Normal pulses.      Heart sounds: Normal heart sounds, S1 normal and S2 normal. No murmur heard.  Pulmonary:      Effort: Pulmonary effort is normal. No respiratory distress.      Breath sounds: Normal breath sounds.   Skin:     General: Skin is warm and dry.   Neurological:      Mental Status: He is alert and oriented to person, place, and time.   Psychiatric:         Attention and Perception: Attention normal.         Mood and Affect: Mood normal.         Behavior: Behavior normal.          Result Review :   The following data was reviewed by: JUDE Goodson on 01/15/2025:      Procedures    Assessment and Plan   Diagnoses and all orders for this visit:    1. Class 2 obesity without serious comorbidity with body mass index (BMI) of 35.0 to 35.9 in adult, unspecified obesity type (Primary)  -     phentermine (ADIPEX-P) 37.5 MG tablet; Take 1 tablet by mouth Every Morning Before Breakfast.  Dispense: 30 tablet; Refill: " 0          Follow Up   Return in about 1 month (around 2/15/2025) for Recheck.  Patient was given instructions and counseling regarding his condition or for health maintenance advice. Please see specific information pulled into the AVS if appropriate.

## 2025-02-17 ENCOUNTER — OFFICE VISIT (OUTPATIENT)
Dept: FAMILY MEDICINE CLINIC | Facility: CLINIC | Age: 50
End: 2025-02-17
Payer: COMMERCIAL

## 2025-02-17 VITALS
DIASTOLIC BLOOD PRESSURE: 74 MMHG | HEART RATE: 107 BPM | TEMPERATURE: 96.9 F | BODY MASS INDEX: 34.35 KG/M2 | WEIGHT: 253.6 LBS | SYSTOLIC BLOOD PRESSURE: 130 MMHG | HEIGHT: 72 IN | OXYGEN SATURATION: 96 %

## 2025-02-17 DIAGNOSIS — Z71.3 NUTRITIONAL COUNSELING: Primary | ICD-10-CM

## 2025-02-17 DIAGNOSIS — E66.812 CLASS 2 OBESITY WITHOUT SERIOUS COMORBIDITY WITH BODY MASS INDEX (BMI) OF 35.0 TO 35.9 IN ADULT, UNSPECIFIED OBESITY TYPE: ICD-10-CM

## 2025-02-17 PROCEDURE — 99213 OFFICE O/P EST LOW 20 MIN: CPT | Performed by: NURSE PRACTITIONER

## 2025-02-17 RX ORDER — PHENTERMINE HYDROCHLORIDE 37.5 MG/1
37.5 TABLET ORAL
Qty: 30 TABLET | Refills: 0 | Status: SHIPPED | OUTPATIENT
Start: 2025-02-17

## 2025-02-17 NOTE — PROGRESS NOTES
"Chief Complaint  Obesity (Phentermine #2, starting weight 258, total loss 5 lb)    Subjective         Immanuel Garcia presents to [facility]  Presents to the office for a follow-up regarding his weight management nutritional counseling.  Patient has lost 5 pounds since his last visit.  Blood pressure is 130/74.  Denies any chest pain shortness breath palpitations at this time.  He does state that he has been experiencing some dry mouth but he has been increasing his water intake.  Patient does state that he would like to continue the medication.  He denies any other concerns at this time.    Obesity       Objective     Vitals:    02/17/25 0759   BP: 130/74   BP Location: Right arm   Patient Position: Sitting   Cuff Size: Adult   Pulse: 107   Temp: 96.9 °F (36.1 °C)   TempSrc: Temporal   SpO2: 96%   Weight: 115 kg (253 lb 9.6 oz)   Height: 182.9 cm (72\")      Body mass index is 34.39 kg/m².             Physical Exam  Vitals reviewed.   Constitutional:       Appearance: Normal appearance.   Cardiovascular:      Rate and Rhythm: Normal rate and regular rhythm.      Pulses: Normal pulses.      Heart sounds: Normal heart sounds, S1 normal and S2 normal. No murmur heard.  Pulmonary:      Effort: Pulmonary effort is normal. No respiratory distress.      Breath sounds: Normal breath sounds.   Skin:     General: Skin is warm and dry.   Neurological:      Mental Status: He is alert and oriented to person, place, and time.   Psychiatric:         Attention and Perception: Attention normal.         Mood and Affect: Mood normal.         Behavior: Behavior normal.          Result Review :   The following data was reviewed by: JUDE Goodson on 02/17/2025:      Procedures    Assessment and Plan   Diagnoses and all orders for this visit:    1. Nutritional counseling (Primary)    2. Class 2 obesity without serious comorbidity with body mass index (BMI) of 35.0 to 35.9 in adult, unspecified obesity " type  -     phentermine (ADIPEX-P) 37.5 MG tablet; Take 1 tablet by mouth Every Morning Before Breakfast.  Dispense: 30 tablet; Refill: 0          Follow Up   Return in about 1 month (around 3/17/2025) for Recheck.  Patient was given instructions and counseling regarding his condition or for health maintenance advice. Please see specific information pulled into the AVS if appropriate.

## 2025-03-14 ENCOUNTER — TELEPHONE (OUTPATIENT)
Dept: FAMILY MEDICINE CLINIC | Facility: CLINIC | Age: 50
End: 2025-03-14
Payer: COMMERCIAL

## 2025-03-17 ENCOUNTER — OFFICE VISIT (OUTPATIENT)
Dept: FAMILY MEDICINE CLINIC | Facility: CLINIC | Age: 50
End: 2025-03-17
Payer: COMMERCIAL

## 2025-03-17 VITALS
HEART RATE: 101 BPM | SYSTOLIC BLOOD PRESSURE: 136 MMHG | BODY MASS INDEX: 34.48 KG/M2 | WEIGHT: 254.6 LBS | HEIGHT: 72 IN | OXYGEN SATURATION: 96 % | DIASTOLIC BLOOD PRESSURE: 84 MMHG | TEMPERATURE: 97.1 F

## 2025-03-17 DIAGNOSIS — E66.811 CLASS 1 OBESITY WITHOUT SERIOUS COMORBIDITY WITH BODY MASS INDEX (BMI) OF 34.0 TO 34.9 IN ADULT, UNSPECIFIED OBESITY TYPE: ICD-10-CM

## 2025-03-17 DIAGNOSIS — Z00.00 WELL ADULT EXAM: Primary | ICD-10-CM

## 2025-03-17 PROCEDURE — 99213 OFFICE O/P EST LOW 20 MIN: CPT | Performed by: NURSE PRACTITIONER

## 2025-03-17 NOTE — PROGRESS NOTES
"Chief Complaint  Obesity (Phentermine #3, starting weight 258, total loss 5 lbs )    Subjective         Immanuel Garcia presents to [facility]  Presents to the office for a follow-up great his weight management.  Patient has plateaued and not getting any weight loss from the phentermine at this point.  I did discuss switching to something more long-term.  Patient states that previously his deductible him from getting the Wegovy.  Patient was on this previously and did really well with it.  I explained that if the deductible remained elevated then we would discuss starting him on bupropion.  I did discuss risk and benefit as well as possible side effects of this medication.  Does state that he needs a wellness visit completed before June.  I explained that I would put the orders in for this and he can schedule the appointment in May.    Obesity       Objective     Vitals:    03/17/25 0801   BP: 136/84   BP Location: Right arm   Patient Position: Sitting   Cuff Size: Adult   Pulse: 101   Temp: 97.1 °F (36.2 °C)   TempSrc: Temporal   SpO2: 96%   Weight: 115 kg (254 lb 9.6 oz)   Height: 182.9 cm (72\")      Body mass index is 34.53 kg/m².             Physical Exam  Vitals reviewed.   Constitutional:       Appearance: Normal appearance.   Cardiovascular:      Rate and Rhythm: Normal rate and regular rhythm.      Pulses: Normal pulses.      Heart sounds: Normal heart sounds, S1 normal and S2 normal. No murmur heard.  Pulmonary:      Effort: Pulmonary effort is normal. No respiratory distress.      Breath sounds: Normal breath sounds.   Skin:     General: Skin is warm and dry.   Neurological:      Mental Status: He is alert and oriented to person, place, and time.   Psychiatric:         Attention and Perception: Attention normal.         Mood and Affect: Mood normal.         Behavior: Behavior normal.          Result Review :   The following data was reviewed by: JUDE Goodson on " 03/17/2025:      Procedures    Assessment and Plan   Diagnoses and all orders for this visit:    1. Well adult exam (Primary)  -     CBC (No Diff); Future  -     Comprehensive Metabolic Panel; Future  -     TSH+Free T4; Future  -     Lipid Panel; Future    2. Class 1 obesity without serious comorbidity with body mass index (BMI) of 34.0 to 34.9 in adult, unspecified obesity type  -     Semaglutide-Weight Management 0.25 MG/0.5ML solution auto-injector; Inject 0.5 mL under the skin into the appropriate area as directed 1 (One) Time Per Week.  Dispense: 2 mL; Refill: 0          Follow Up   Return in about 2 months (around 5/17/2025) for Recheck, Annual physical.  Patient was given instructions and counseling regarding his condition or for health maintenance advice. Please see specific information pulled into the AVS if appropriate.

## 2025-03-18 RX ORDER — BUPROPION HYDROCHLORIDE 150 MG/1
150 TABLET ORAL DAILY
Qty: 90 TABLET | Refills: 0 | Status: SHIPPED | OUTPATIENT
Start: 2025-03-18

## 2025-06-11 RX ORDER — SEMAGLUTIDE 1 MG/.5ML
1 INJECTION, SOLUTION SUBCUTANEOUS WEEKLY
Qty: 2 ML | Refills: 0 | Status: SHIPPED | OUTPATIENT
Start: 2025-06-11

## 2025-07-08 RX ORDER — SEMAGLUTIDE 1.7 MG/.75ML
1.7 INJECTION, SOLUTION SUBCUTANEOUS WEEKLY
Qty: 3 ML | Refills: 2 | Status: SHIPPED | OUTPATIENT
Start: 2025-07-08

## 2025-07-08 RX ORDER — PANTOPRAZOLE SODIUM 40 MG/1
40 TABLET, DELAYED RELEASE ORAL DAILY
Qty: 90 TABLET | Refills: 1 | Status: SHIPPED | OUTPATIENT
Start: 2025-07-08

## 2025-08-15 RX ORDER — SEMAGLUTIDE 2.4 MG/.75ML
2.4 INJECTION, SOLUTION SUBCUTANEOUS WEEKLY
Qty: 3 ML | Refills: 2 | Status: SHIPPED | OUTPATIENT
Start: 2025-08-15

## (undated) DEVICE — SOL IRRG H2O PL/BG 1000ML STRL

## (undated) DEVICE — SOL IRR H2O BO 1000ML STRL

## (undated) DEVICE — TUBING, SUCTION, 1/4" X 10', STRAIGHT: Brand: MEDLINE

## (undated) DEVICE — SOLIDIFIER LIQLOC PLS 1500CC BT

## (undated) DEVICE — CONN JET HYDRA H20 AUXILIARY DISP

## (undated) DEVICE — LINER SURG CANSTR SXN S/RIGD 1500CC

## (undated) DEVICE — Device

## (undated) DEVICE — STERILE POLYISOPRENE POWDER-FREE SURGICAL GLOVES WITH EMOLLIENT COATING: Brand: PROTEXIS

## (undated) DEVICE — Device: Brand: DEFENDO AIR/WATER/SUCTION AND BIOPSY VALVE

## (undated) DEVICE — STERILE POLYISOPRENE POWDER-FREE SURGICAL GLOVES: Brand: PROTEXIS